# Patient Record
Sex: FEMALE | Race: BLACK OR AFRICAN AMERICAN | NOT HISPANIC OR LATINO | URBAN - METROPOLITAN AREA
[De-identification: names, ages, dates, MRNs, and addresses within clinical notes are randomized per-mention and may not be internally consistent; named-entity substitution may affect disease eponyms.]

---

## 2021-06-15 ENCOUNTER — EMERGENCY (EMERGENCY)
Facility: HOSPITAL | Age: 36
LOS: 1 days | Discharge: HOME | End: 2021-06-15
Attending: EMERGENCY MEDICINE | Admitting: EMERGENCY MEDICINE
Payer: OTHER MISCELLANEOUS

## 2021-06-15 VITALS
HEART RATE: 91 BPM | OXYGEN SATURATION: 98 % | SYSTOLIC BLOOD PRESSURE: 107 MMHG | RESPIRATION RATE: 16 BRPM | DIASTOLIC BLOOD PRESSURE: 62 MMHG

## 2021-06-15 PROCEDURE — 73562 X-RAY EXAM OF KNEE 3: CPT | Mod: 26,LT

## 2021-06-15 PROCEDURE — 73030 X-RAY EXAM OF SHOULDER: CPT | Mod: 26,LT

## 2021-06-15 PROCEDURE — 73610 X-RAY EXAM OF ANKLE: CPT | Mod: 26,LT

## 2021-06-15 PROCEDURE — 99284 EMERGENCY DEPT VISIT MOD MDM: CPT

## 2021-06-15 PROCEDURE — 73080 X-RAY EXAM OF ELBOW: CPT | Mod: 26,LT

## 2021-06-15 RX ORDER — IBUPROFEN 200 MG
600 TABLET ORAL ONCE
Refills: 0 | Status: COMPLETED | OUTPATIENT
Start: 2021-06-15 | End: 2021-06-15

## 2021-06-15 RX ADMIN — Medication 600 MILLIGRAM(S): at 17:38

## 2021-06-15 NOTE — ED PROVIDER NOTE - CARE PROVIDER_API CALL
Hakeem Sanderson (MD)  Orthopaedic Surgery  [address], [address]  [place] [address]  Phone: [phone]  Fax: [phone]  Follow Up Time:

## 2021-06-15 NOTE — ED PROVIDER NOTE - OBJECTIVE STATEMENT
36 yo female with hx asthma c/o pain to left side s/p slip and fall at work yesterday. patient reports she slipped on water and fell into left side. denies head injury or loc. c/o pain to left shoulder, elbow, knee and ankle. ambulatory. denies chest pain, sob or abdominal pain  took ibuprofen yesterday and today  denies back pain

## 2021-06-15 NOTE — ED PROVIDER NOTE - CLINICAL SUMMARY MEDICAL DECISION MAKING FREE TEXT BOX
s/p slip and fall yesterday, with fall onto left side, with pain to shoulder, elbow, knee and ankle  no deformity  well appearing  will xray  nsaids

## 2021-06-15 NOTE — ED PROVIDER NOTE - PATIENT PORTAL LINK FT
You can access the FollowMyHealth Patient Portal offered by [facility] by registering at the following website: [email or web address]. By joining [employer]’s FollowMyHealth portal, you will also be able to view your health information using other applications (apps) compatible with our system.

## 2021-06-15 NOTE — ED ADULT TRIAGE NOTE - CHIEF COMPLAINT QUOTE
Patient states that she fell today at work, slipped on a wet floor. C/o left shoulder, elbow, knee and ankle pain. Patient denies hitting her head , no blood thinners.

## 2021-06-15 NOTE — ED PROVIDER NOTE - PHYSICAL EXAMINATION
ambulates with steady gait  ttp lateral aspect left shoulder; FROM; NV intact; no deformity  pain with flexion at elbow; no deformity; FROM; no bony tenderness  no tenderness to left forearm, wrist or hand  2+ radial and ulna pulses; sensation intact    no ttp left hip  ttp lateral and medial aspect left knee; FROM; no ligamental laxity; neg anterior/posterior drawer  no ankle tenderness/no foot tenderness  2+ pulses  sensation intact